# Patient Record
Sex: FEMALE
[De-identification: names, ages, dates, MRNs, and addresses within clinical notes are randomized per-mention and may not be internally consistent; named-entity substitution may affect disease eponyms.]

---

## 2019-06-27 ENCOUNTER — HOSPITAL ENCOUNTER (EMERGENCY)
Dept: HOSPITAL 5 - ED | Age: 39
Discharge: HOME | End: 2019-06-27
Payer: SELF-PAY

## 2019-06-27 VITALS — SYSTOLIC BLOOD PRESSURE: 188 MMHG | DIASTOLIC BLOOD PRESSURE: 95 MMHG

## 2019-06-27 DIAGNOSIS — Z88.6: ICD-10-CM

## 2019-06-27 DIAGNOSIS — N23: Primary | ICD-10-CM

## 2019-06-27 DIAGNOSIS — Z88.5: ICD-10-CM

## 2019-06-27 DIAGNOSIS — F17.200: ICD-10-CM

## 2019-06-27 DIAGNOSIS — Z88.8: ICD-10-CM

## 2019-06-27 DIAGNOSIS — Z90.89: ICD-10-CM

## 2019-06-27 LAB
BASOPHILS # (AUTO): 0.1 K/MM3 (ref 0–0.1)
BASOPHILS NFR BLD AUTO: 1.2 % (ref 0–1.8)
BILIRUB UR QL STRIP: (no result)
BLOOD UR QL VISUAL: (no result)
BUN SERPL-MCNC: 16 MG/DL (ref 7–17)
BUN/CREAT SERPL: 23 %
CALCIUM SERPL-MCNC: 9.1 MG/DL (ref 8.4–10.2)
EOSINOPHIL # BLD AUTO: 0.3 K/MM3 (ref 0–0.4)
EOSINOPHIL NFR BLD AUTO: 2.8 % (ref 0–4.3)
HCT VFR BLD CALC: 42.6 % (ref 30.3–42.9)
HEMOLYSIS INDEX: 11
HGB BLD-MCNC: 14.3 GM/DL (ref 10.1–14.3)
LYMPHOCYTES # BLD AUTO: 2.8 K/MM3 (ref 1.2–5.4)
LYMPHOCYTES NFR BLD AUTO: 29 % (ref 13.4–35)
MCHC RBC AUTO-ENTMCNC: 34 % (ref 30–34)
MCV RBC AUTO: 93 FL (ref 79–97)
MONOCYTES # (AUTO): 0.6 K/MM3 (ref 0–0.8)
MONOCYTES % (AUTO): 5.9 % (ref 0–7.3)
MUCOUS THREADS #/AREA URNS HPF: (no result) /HPF
PH UR STRIP: 6 [PH] (ref 5–7)
PLATELET # BLD: 375 K/MM3 (ref 140–440)
PROT UR STRIP-MCNC: (no result) MG/DL
RBC # BLD AUTO: 4.6 M/MM3 (ref 3.65–5.03)
RBC #/AREA URNS HPF: > 182 /HPF (ref 0–6)
UROBILINOGEN UR-MCNC: < 2 MG/DL (ref ?–2)
WBC #/AREA URNS HPF: 5 /HPF (ref 0–6)

## 2019-06-27 PROCEDURE — 96375 TX/PRO/DX INJ NEW DRUG ADDON: CPT

## 2019-06-27 PROCEDURE — 96374 THER/PROPH/DIAG INJ IV PUSH: CPT

## 2019-06-27 PROCEDURE — 74176 CT ABD & PELVIS W/O CONTRAST: CPT

## 2019-06-27 PROCEDURE — 36415 COLL VENOUS BLD VENIPUNCTURE: CPT

## 2019-06-27 PROCEDURE — 80048 BASIC METABOLIC PNL TOTAL CA: CPT

## 2019-06-27 PROCEDURE — 84703 CHORIONIC GONADOTROPIN ASSAY: CPT

## 2019-06-27 PROCEDURE — 81001 URINALYSIS AUTO W/SCOPE: CPT

## 2019-06-27 PROCEDURE — 85025 COMPLETE CBC W/AUTO DIFF WBC: CPT

## 2019-06-27 NOTE — CAT SCAN REPORT
CT ABDOMEN PELVIS WITHOUT CONTRAST:



HISTORY:  Right flank pain.



COMPARISON: none.



TECHNIQUE:  Helical CT in 1.25mm intervals without IV contrast. 

Sagittal and coronal reconstructions. 





FINDINGS:



Lung bases: Normal.



Liver: Normal.



Biliary system: Normal.



Pancreas: Normal.



Spleen: Normal.



Kidneys/ureters/bladder: Approximately 4 or 5 calyceal stones are 

identified in both kidneys. The stones measure up to 4-5 mm in 

diameter. No ureteral stones or hydronephrosis is identified. The 

bladder is unremarkable.



Adrenal glands: Normal.



Aorta: Normal.



Intestines: Within normal limits given no oral contrast was 

administered.



Appendix: Not confidently identified, correlate with surgical history.



Pelvic viscera: Hysterectomy changes are suspected.



Ascites: None.



Adenopathy: None.



Musculoskeletal: Intact.





IMPRESSION:

Bilateral renal calyceal stones as described. No ureteral stones or 

hydronephrosis is identified.

Surgical changes as described.

## 2019-06-27 NOTE — EMERGENCY DEPARTMENT REPORT
ED Abdominal Pain HPI





- General


Chief Complaint: Abdominal Pain


Stated Complaint: POSS KIDNEY STONE


Time Seen by Provider: 19 10:46


Source: patient


Mode of arrival: Ambulatory


Limitations: No Limitations





- History of Present Illness


Initial Comments: 





Patient is a 38-year-old  female with past medical history of kidney 

stones who presented with right flank pain starting early this morning.  Patient

is here today with her  who is admitted for pneumonia.  Patient states 

that the pain is a 8 out of 10 in severity starts in the back and radiates to 

the right flank.  She states she does have some hematuria and urinary frequency.

 Pain is 8 out of 10 in severity.  Patient is has nausea with no vomiting.  She 

denies any fevers chills diarrhea, cold or congestion at this time.  Patient 

states this feels very similar to times where she has had to pass a kidney 

stone.  Patient has never had stenting however she has had lithotripsy in the 

past.





- Related Data


                                  Previous Rx's











 Medication  Instructions  Recorded  Last Taken  Type


 


HYDROcodone/APAP 5-325 [Wayland 1 each PO Q6HR PRN #10 tablet 19 Unknown Rx





5/325]    


 


Nitrofurantoin Mono/M-Cryst 100 mg PO Q12HR #14 capsule 19 Unknown Rx





[Macrobid CAP]    


 


Ondansetron [Zofran Odt] 4 mg PO Q8HR #10 tab.rapdis 19 Unknown Rx











                                    Allergies











Allergy/AdvReac Type Severity Reaction Status Date / Time


 


ketorolac [From Toradol] Allergy  Hives Verified 19 10:29


 


metoclopramide [From Reglan] Allergy  Hives Verified 19 10:29


 


morphine Allergy  Hives Verified 19 10:29














ED Review of Systems


ROS: 


Stated complaint: POSS KIDNEY STONE


Other details as noted in HPI





Comment: All other systems reviewed and negative





ED Past Medical Hx





- Past Medical History


Previous Medical History?: Yes


Additional medical history: hx kidney stones





- Surgical History


Past Surgical History?: Yes


Hx Appendectomy: Yes


Additional Surgical History: Partial hysterectomy





- Social History


Smoking Status: Current Every Day Smoker


Substance Use Type: None





- Medications


Home Medications: 


                                Home Medications











 Medication  Instructions  Recorded  Confirmed  Last Taken  Type


 


HYDROcodone/APAP 5-325 [Wayland 1 each PO Q6HR PRN #10 tablet 19  Unknown Rx





5/325]     


 


Nitrofurantoin Mono/M-Cryst 100 mg PO Q12HR #14 capsule 19  Unknown Rx





[Macrobid CAP]     


 


Ondansetron [Zofran Odt] 4 mg PO Q8HR #10 tab.rapdis 19  Unknown Rx














ED Physical Exam





- General


Limitations: No Limitations


General appearance: alert, in distress





- Head


Head exam: Present: atraumatic, normocephalic





- Eye


Eye exam: Present: normal appearance





- ENT


ENT exam: Present: mucous membranes moist





- Neck


Neck exam: Present: normal inspection





- Respiratory


Respiratory exam: Present: normal lung sounds bilaterally.  Absent: respiratory 

distress, wheezes, rales, rhonchi





- Cardiovascular


Cardiovascular Exam: Present: regular rate, normal rhythm, normal heart sounds. 

Absent: systolic murmur, diastolic murmur, rubs, gallop





- GI/Abdominal


GI/Abdominal exam: Present: soft, normal bowel sounds.  Absent: distended, 

guarding, rebound





- Extremities Exam


Extremities exam: Present: normal inspection





- Back Exam


Back exam: Present: normal inspection, CVA tenderness (R)





- Neurological Exam


Neurological exam: Present: alert, oriented X3





- Psychiatric


Psychiatric exam: Present: normal affect, normal mood





- Skin


Skin exam: Present: warm, dry, intact, normal color.  Absent: rash





ED Course


                                   Vital Signs











  19





  10:38


 


Temperature 97.8 F


 


Pulse Rate 111 H


 


Respiratory 20





Rate 


 


Blood Pressure 124/88


 


O2 Sat by Pulse 100





Oximetry 














ED Medical Decision Making





- Lab Data


Result diagrams: 


                                 19 11:15





                                 19 11:15








                                   Lab Results











  19 Range/Units





  10:58 11:15 11:15 


 


WBC   9.7   (4.5-11.0)  K/mm3


 


RBC   4.60   (3.65-5.03)  M/mm3


 


Hgb   14.3   (10.1-14.3)  gm/dl


 


Hct   42.6   (30.3-42.9)  %


 


MCV   93   (79-97)  fl


 


MCH   31   (28-32)  pg


 


MCHC   34   (30-34)  %


 


RDW   13.5   (13.2-15.2)  %


 


Plt Count   375   (140-440)  K/mm3


 


Lymph % (Auto)   29.0   (13.4-35.0)  %


 


Mono % (Auto)   5.9   (0.0-7.3)  %


 


Eos % (Auto)   2.8   (0.0-4.3)  %


 


Baso % (Auto)   1.2   (0.0-1.8)  %


 


Lymph #   2.8   (1.2-5.4)  K/mm3


 


Mono #   0.6   (0.0-0.8)  K/mm3


 


Eos #   0.3   (0.0-0.4)  K/mm3


 


Baso #   0.1   (0.0-0.1)  K/mm3


 


Seg Neutrophils %   61.1   (40.0-70.0)  %


 


Seg Neutrophils #   6.0   (1.8-7.7)  K/mm3


 


Sodium    139  (137-145)  mmol/L


 


Potassium    4.3  (3.6-5.0)  mmol/L


 


Chloride    104.9  ()  mmol/L


 


Carbon Dioxide    24  (22-30)  mmol/L


 


Anion Gap    14  mmol/L


 


BUN    16  (7-17)  mg/dL


 


Creatinine    0.7  (0.7-1.2)  mg/dL


 


Estimated GFR    > 60  ml/min


 


BUN/Creatinine Ratio    23  %


 


Glucose    86  ()  mg/dL


 


Calcium    9.1  (8.4-10.2)  mg/dL


 


HCG, Qual     (Negative)  


 


Urine Color  Yellow    (Yellow)  


 


Urine Turbidity  Slightly-cloudy    (Clear)  


 


Urine pH  6.0    (5.0-7.0)  


 


Ur Specific Gravity  1.013    (1.003-1.030)  


 


Urine Protein  <15 mg/dl    (Negative)  mg/dL


 


Urine Glucose (UA)  Neg    (Negative)  mg/dL


 


Urine Ketones  Neg    (Negative)  mg/dL


 


Urine Blood  Lg    (Negative)  


 


Urine Nitrite  Neg    (Negative)  


 


Urine Bilirubin  Neg    (Negative)  


 


Urine Urobilinogen  < 2.0    (<2.0)  mg/dL


 


Ur Leukocyte Esterase  Neg    (Negative)  


 


Urine WBC (Auto)  5.0    (0.0-6.0)  /HPF


 


Urine RBC (Auto)  > 182.0    (0.0-6.0)  /HPF


 


U Epithel Cells (Auto)  15.0 H    (0-13.0)  /HPF


 


Urine Mucus  Few    /HPF














  19 Range/Units





  11:15 


 


WBC   (4.5-11.0)  K/mm3


 


RBC   (3.65-5.03)  M/mm3


 


Hgb   (10.1-14.3)  gm/dl


 


Hct   (30.3-42.9)  %


 


MCV   (79-97)  fl


 


MCH   (28-32)  pg


 


MCHC   (30-34)  %


 


RDW   (13.2-15.2)  %


 


Plt Count   (140-440)  K/mm3


 


Lymph % (Auto)   (13.4-35.0)  %


 


Mono % (Auto)   (0.0-7.3)  %


 


Eos % (Auto)   (0.0-4.3)  %


 


Baso % (Auto)   (0.0-1.8)  %


 


Lymph #   (1.2-5.4)  K/mm3


 


Mono #   (0.0-0.8)  K/mm3


 


Eos #   (0.0-0.4)  K/mm3


 


Baso #   (0.0-0.1)  K/mm3


 


Seg Neutrophils %   (40.0-70.0)  %


 


Seg Neutrophils #   (1.8-7.7)  K/mm3


 


Sodium   (137-145)  mmol/L


 


Potassium   (3.6-5.0)  mmol/L


 


Chloride   ()  mmol/L


 


Carbon Dioxide   (22-30)  mmol/L


 


Anion Gap   mmol/L


 


BUN   (7-17)  mg/dL


 


Creatinine   (0.7-1.2)  mg/dL


 


Estimated GFR   ml/min


 


BUN/Creatinine Ratio   %


 


Glucose   ()  mg/dL


 


Calcium   (8.4-10.2)  mg/dL


 


HCG, Qual  Negative  (Negative)  


 


Urine Color   (Yellow)  


 


Urine Turbidity   (Clear)  


 


Urine pH   (5.0-7.0)  


 


Ur Specific Gravity   (1.003-1.030)  


 


Urine Protein   (Negative)  mg/dL


 


Urine Glucose (UA)   (Negative)  mg/dL


 


Urine Ketones   (Negative)  mg/dL


 


Urine Blood   (Negative)  


 


Urine Nitrite   (Negative)  


 


Urine Bilirubin   (Negative)  


 


Urine Urobilinogen   (<2.0)  mg/dL


 


Ur Leukocyte Esterase   (Negative)  


 


Urine WBC (Auto)   (0.0-6.0)  /HPF


 


Urine RBC (Auto)   (0.0-6.0)  /HPF


 


U Epithel Cells (Auto)   (0-13.0)  /HPF


 


Urine Mucus   /HPF














- Radiology Data





[facility] 


[address] [place] [address] 





Cat Scan Report 


Signed 





Patient: DELIA DORSEY MR#: [id number] 


[id number] 


: 1980 Acct:[id number] 





Age/Sex: 38 / F ADM Date: 19 





Loc: ED 


Attending Dr: 








Ordering Physician: DANNY LEVINE MD 


Date of Service: 19 


Procedure(s): CT abdomen pelvis wo con 


Accession Number(s): [id number] 





cc: DANNY LEVINE MD 








CT ABDOMEN PELVIS WITHOUT CONTRAST: 





HISTORY: Right flank pain. 





COMPARISON: none. 





TECHNIQUE: Helical CT in 1.25mm intervals without IV contrast. 


Sagittal and coronal reconstructions. 








FINDINGS: 





Lung bases: Normal. 





Liver: Normal. 





Biliary system: Normal. 





Pancreas: Normal. 





Spleen: Normal. 





Kidneys/ureters/bladder: Approximately 4 or 5 calyceal stones are 


identified in both kidneys. The stones measure up to 4-5 mm in 


diameter. No ureteral stones or hydronephrosis is identified. The 


bladder is unremarkable. 





Adrenal glands: Normal. 





Aorta: Normal. 





Intestines: Within normal limits given no oral contrast was 


administered. 





Appendix: Not confidently identified, correlate with surgical history. 





Pelvic viscera: Hysterectomy changes are suspected. 





Ascites: None. 





Adenopathy: None. 





Musculoskeletal: Intact. 








IMPRESSION: 


Bilateral renal calyceal stones as described. No ureteral stones or 


hydronephrosis is identified. 


Surgical changes as described. 





Transcribed By: TTR 


Dictated By: RUBINA PLEITEZ JR, MD 


Electronically Authenticated By: RUBINA PLEITEZ JR, MD 


Signed Date/Time: 19 1143 











DD/DT: 19 1141 


TD/TT: 19 1143





- Medical Decision Making





Patient with right flank pain with nausea and vomiting.  CT shows bilateral 

kidney stones however there is no obstructive uropathy present at this time.  

Patient does have large amount of hematuria.  Patient likely has passed this 

small stone is having some residual pain.  Patient will be given meds for 

symptomatic relief will be discharged home.


Critical care attestation.: 


If time is entered above; I have spent that time in minutes in the direct care 

of this critically ill patient, excluding procedure time.








ED Disposition


Clinical Impression: 


 Renal colic on right side, Hematuria





Disposition: - TO HOME OR SELFCARE


Is pt being admited?: No


Does the pt Need Aspirin: No


Condition: Stable


Instructions:  Acute Hematuria (ED)


Referrals: 


ESAU SANCHEZ MD [Staff Physician] - 3-5 Days


Time of Disposition: 12:58

## 2019-11-04 ENCOUNTER — HOSPITAL ENCOUNTER (EMERGENCY)
Dept: HOSPITAL 5 - ED | Age: 39
Discharge: HOME | End: 2019-11-04
Payer: SELF-PAY

## 2019-11-04 VITALS — DIASTOLIC BLOOD PRESSURE: 92 MMHG | SYSTOLIC BLOOD PRESSURE: 143 MMHG

## 2019-11-04 DIAGNOSIS — G56.03: Primary | ICD-10-CM

## 2019-11-04 DIAGNOSIS — M79.642: ICD-10-CM

## 2019-11-04 DIAGNOSIS — M79.641: ICD-10-CM

## 2019-11-04 PROCEDURE — 99282 EMERGENCY DEPT VISIT SF MDM: CPT

## 2019-11-04 NOTE — EMERGENCY DEPARTMENT REPORT
Chief Complaint: Neuro Symptoms/Deficit


Stated Complaint: LOST USE OF HANDS


Time Seen by Provider: 11/04/19 12:51





- HPI


History of Present Illness: 





38 y o female with no Prior medical History presents with  cc of bilateral hand 

tingling and pain x 2 days, she denies fall or trauma or injuries to hands.


Patient states that she uses her hands a lot.  Patient states that pain is 

intermittent and sharp in nature that is localized to her hands.  Patient states

she put an Ace bandage around her hands to help with the pain.  She denies any 

injury trauma falls to the hand.  Denies diabetes or any other medical 

conditions.








- ROS


Review of Systems: 





As noted in HPI





- Exam


Vital Signs: 


                                   Vital Signs











  11/04/19





  11:37


 


Temperature 98.4 F


 


Pulse Rate 114 H


 


Respiratory 18





Rate 


 


Blood Pressure 143/92


 


O2 Sat by Pulse 100





Oximetry 











Physical Exam: 





EXT: No swelling, no redness, no deformity of the extremities upper and lower 

bilaterally.


phalen's test positive.  Radial Pulses 2+ bilaterally,





MSE screening note: 


Focused history and physical exam performed.


Due to findings the following was ordered:











ED Medical Decision Making





- Medical Decision Making


Use 9-year-old female presents with parasthesia of bilateral hands most likely 

secondary to the carpal tunnel.


Discussed with patient follow-up with orthopedics/neurologist is warranted.


Discussed follow-up with her primary care physician as well.


Patient is in no acute distress and had no neurological deficit


Vital signs are normal she understands instructions








ED Disposition for MSE


Clinical Impression: 


 Paresthesia of hand, bilateral, Arthralgia, Carpal tunnel syndrome





Disposition: DC-01 TO HOME OR SELFCARE


Is pt being admited?: No


Does the pt Need Aspirin: No


Condition: Stable


Instructions:  Carpal Tunnel Syndrome (ED), Tendinitis (ED)


Additional Instructions: 


Make sure to follow up with neurologist as you have been reffered


Take your meds as Prescribed


Prescriptions: 


Cyclobenzaprine [Flexeril] 10 mg PO QHS PRN #20 tablet


 PRN Reason: Muscle Spasm


Diclofenac  [Voltaren ] 75 mg PO BID #30 tablet


Referrals: 


PRIMARY CARE,MD [Primary Care Provider] - 3-5 Days


JUANIS PEREZ MD [Staff Physician] - 3-5 Days


JORGE CHRISTIANSON MD [Staff Physician] - 3-5 Days


Forms:  Accompanied Note, Work/School Release Form(ED)


Time of Disposition: 13:17